# Patient Record
Sex: MALE | Race: BLACK OR AFRICAN AMERICAN | NOT HISPANIC OR LATINO | URBAN - METROPOLITAN AREA
[De-identification: names, ages, dates, MRNs, and addresses within clinical notes are randomized per-mention and may not be internally consistent; named-entity substitution may affect disease eponyms.]

---

## 2020-03-17 ENCOUNTER — EMERGENCY (EMERGENCY)
Facility: HOSPITAL | Age: 30
LOS: 1 days | Discharge: HOME | End: 2020-03-17
Attending: EMERGENCY MEDICINE
Payer: COMMERCIAL

## 2020-03-17 VITALS
HEIGHT: 68 IN | SYSTOLIC BLOOD PRESSURE: 130 MMHG | TEMPERATURE: 98 F | RESPIRATION RATE: 16 BRPM | WEIGHT: 160.06 LBS | HEART RATE: 68 BPM | DIASTOLIC BLOOD PRESSURE: 84 MMHG | OXYGEN SATURATION: 98 %

## 2020-03-17 PROCEDURE — 99284 EMERGENCY DEPT VISIT MOD MDM: CPT

## 2020-03-18 PROCEDURE — 99283 EMERGENCY DEPT VISIT LOW MDM: CPT

## 2020-03-18 PROCEDURE — 73030 X-RAY EXAM OF SHOULDER: CPT | Mod: 26,LT

## 2020-03-18 PROCEDURE — 73030 X-RAY EXAM OF SHOULDER: CPT

## 2020-03-18 RX ORDER — LIDOCAINE 4 G/100G
1 CREAM TOPICAL ONCE
Refills: 0 | Status: COMPLETED | OUTPATIENT
Start: 2020-03-18 | End: 2020-03-18

## 2020-03-18 RX ORDER — IBUPROFEN 200 MG
600 TABLET ORAL ONCE
Refills: 0 | Status: COMPLETED | OUTPATIENT
Start: 2020-03-18 | End: 2020-03-18

## 2020-03-18 RX ADMIN — LIDOCAINE 1 PATCH: 4 CREAM TOPICAL at 00:26

## 2020-03-18 RX ADMIN — Medication 600 MILLIGRAM(S): at 00:25

## 2020-03-18 NOTE — ED PROVIDER NOTE - OBJECTIVE STATEMENT
29 year old male with no pmhx presernts to the ER with left shoulder pain x 2 days after lifting heavy bags.  Patient denies fall, no trauma. no back pain. no prior injuries. no numbness/tingling.

## 2020-03-18 NOTE — ED PROVIDER NOTE - PATIENT PORTAL LINK FT
You can access the FollowMyHealth Patient Portal offered by [facility] by registering at the following website: [email or web address]. By joining [email or web address]’s FollowMyHealth portal, you will also be able to view your health information using other applications (apps) compatible with our system.

## 2020-03-18 NOTE — ED PROVIDER NOTE - NSFOLLOWUPINSTRUCTIONS_ED_ALL_ED_FT
You were seen in the emergency department for shoulder pain.     Please follow-up with your primary care doctor in the next 24-48 hours.     Please take Ibuprofen (Advil) and Tylenol as prescribed on the bottles for symptom control.     Please use Salon Pas or Icy Hot patches as prescribed on the packages for symptom control.     If you have any worsening symptoms, severe chest pain, difficulty breathing, nausea or vomiting, please return to the emergency department.

## 2020-03-18 NOTE — ED PROVIDER NOTE - CLINICAL SUMMARY MEDICAL DECISION MAKING FREE TEXT BOX
Sondra: Patient with left shoulder pain s/p lifting heavy bags. + ttp. no swelling, FROM of left shoulder. + muscle spasm. + 2 radial pulse b/l ue, skin intact. will get xray, pain control, reassess

## 2020-03-18 NOTE — ED ADULT NURSE NOTE - OBJECTIVE STATEMENT
29 y.o M A&OX3 with sig PMH presents to the ED c.o L scapula pain radiating down back. Pt. reports he was heavy lifting and developed severe L shoulder pain radiating down back after. Pt. took Advil yesterday morning with no relief. Pt. reports he was unable to sleep due to discomfort. Pt. able to lift LUE. No obvious deformities noted. Strong peripheral pulse. Safety and comfort provided. Pain medication administered and awaiting xray results.
